# Patient Record
Sex: MALE | Race: ASIAN | NOT HISPANIC OR LATINO
[De-identification: names, ages, dates, MRNs, and addresses within clinical notes are randomized per-mention and may not be internally consistent; named-entity substitution may affect disease eponyms.]

---

## 2021-09-16 ENCOUNTER — APPOINTMENT (OUTPATIENT)
Dept: OTOLARYNGOLOGY | Facility: CLINIC | Age: 61
End: 2021-09-16
Payer: COMMERCIAL

## 2021-09-16 VITALS — WEIGHT: 165 LBS | TEMPERATURE: 97.5 F | BODY MASS INDEX: 23.1 KG/M2 | HEIGHT: 71 IN

## 2021-09-16 DIAGNOSIS — H69.83 OTHER SPECIFIED DISORDERS OF EUSTACHIAN TUBE, BILATERAL: ICD-10-CM

## 2021-09-16 DIAGNOSIS — H60.509 UNSPECIFIED ACUTE NONINFECTIVE OTITIS EXTERNA, UNSPECIFIED EAR: ICD-10-CM

## 2021-09-16 DIAGNOSIS — M26.609 UNSPECIFIED TEMPOROMANDIBULAR JOINT DISORDER: ICD-10-CM

## 2021-09-16 PROCEDURE — 99204 OFFICE O/P NEW MOD 45 MIN: CPT | Mod: 25

## 2021-09-16 PROCEDURE — 92504 EAR MICROSCOPY EXAMINATION: CPT

## 2021-09-16 RX ORDER — CIPROFLOXACIN AND DEXAMETHASONE 3; 1 MG/ML; MG/ML
0.3-0.1 SUSPENSION/ DROPS AURICULAR (OTIC) TWICE DAILY
Qty: 1 | Refills: 1 | Status: ACTIVE | COMMUNITY
Start: 2021-09-16 | End: 1900-01-01

## 2021-09-16 NOTE — CONSULT LETTER
[Dear  ___] : Dear  [unfilled], [Consult Letter:] : I had the pleasure of evaluating your patient, [unfilled]. [Please see my note below.] : Please see my note below. [Consult Closing:] : Thank you very much for allowing me to participate in the care of this patient.  If you have any questions, please do not hesitate to contact me. [Sincerely,] : Sincerely, [FreeTextEntry3] : Zully Carrasquillo MD\par

## 2021-09-16 NOTE — ASSESSMENT
[FreeTextEntry1] : He has had bilateral ear pressure since he went swimming in July. He saw in [place] had ears cleaned. He was placed and eardrops and medication was given prednisone. He still a symptoms although he may be a little bit better. The left ear was normal on exam. The right ear and mild irritation. I discussed other possible causes of his symptoms including eustachian tube dysfunction and TMJ dysfunction.\par \par Plan\par -Findings and management options were discussed with the patient and his son\par -Good aural hygiene reviewed. The patient was told to avoid cleaning the ears with cotton swabs.\par -dry ear precautions. he was given ear plugs\par -the patient will be placed on antibiotic ear drops for a few days for the right ear.\par -I spoke with him about repeating the hearing test. they are going off for now and bring the results of his last test with them to review when they return\par -I recommended dental evaluation to rule out TMJ dysfunction as a source for his symptoms\par -They may try Flonase and/or a decongestant.\par -I recommended smoking cessation\par -I spoke with them about performing flexible laryngoscopy. They deferred today.\par -follow up in one to 2 weeks. We will see how he is feeling and discuss further management\par -call and return earlier if any concerns

## 2021-09-16 NOTE — HISTORY OF PRESENT ILLNESS
[de-identified] : LINDEN US is a 61 year patient Here for ear pressure and fullness since he went swimming in late July. He got water in his ears. He saw an ENT about 2 weeks ago and had the ears cleaned. He was given eardrops and medication which may have been steroids. He may be slightly better but still has fullness in both ears although the right ear is worse. He has no otalgia, tinnitus, or dizziness. He has no nasal obstruction, nasal congestion, throat symptoms, or TMJ dysfunction. He does smoke e-cigarettes. He denies a history of recurrent infections, prior otologic surgery, ear trauma, or noise exposure. He saw an ENT in 2013. I was able to review that note. 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 He also had right-sided hearing loss per the note. He does not have a copy of his most recent audiogram.  He does see a dentist.